# Patient Record
Sex: FEMALE | Race: AMERICAN INDIAN OR ALASKA NATIVE
[De-identification: names, ages, dates, MRNs, and addresses within clinical notes are randomized per-mention and may not be internally consistent; named-entity substitution may affect disease eponyms.]

---

## 2018-07-12 ENCOUNTER — HOSPITAL ENCOUNTER (OUTPATIENT)
Dept: HOSPITAL 5 - MAMMO | Age: 58
Discharge: HOME | End: 2018-07-12
Attending: FAMILY MEDICINE
Payer: OTHER GOVERNMENT

## 2018-07-12 DIAGNOSIS — Z12.31: Primary | ICD-10-CM

## 2018-07-12 PROCEDURE — 77067 SCR MAMMO BI INCL CAD: CPT

## 2018-07-12 NOTE — MAMMOGRAPHY REPORT
BILATERAL DIGITAL SCREENING MAMMOGRAM with CAD: July 12 eighteen



CLINICAL: Routine screening.



COMPARISON:None available.  However, a prior mammogram was apparently 

done at [facility].



FINDINGS: There are bilateral scattered fibroglandular densities.Subtle 

bilateral asymmetries on the CC views require comparison with the prior 

mammogram.



IMPRESSION: Bilateral asymmetries  requiring further evaluation.



BI-RADS CATEGORY:  0 -- Additional Evaluation Required



RECOMMENDATION: Comparison with a previous mammogram.



We will attempt to obtain a prior mammogram for comparison.  If we do 

not obtain a prior mammogram  within 30 days, a revised report will be 

issued recommending a recall for additional imaging. Please be advised 

that the patient should not schedule an appointment for return until 

adequate time (at least 2 weeks) has passed for us to obtain the prior 

mammogram.



ACR BI-RADS MAMMOGRAPHIC CODES:

0 = Needs additional imaging evaluation; 1 = Negative; 2 = Benign; 3 = 

Probably benign; 4 = Suspicious; 5 = Malignant; 6 = Known biopsy-proven 

malignancy



COMMENT:

      1.   Dense breast tissue, i.e., adenosis, fibrocystic 

            changes, etc., may obscure an underlying neoplasm.

      2.   Approximately 10% of cancers are not detected with

            mammography.

      3.   A negative mammography report should not delay biopsy 

            if a clinically suspicious mass is present.



COMMENT:

Patient follow-up letters are generated via our [employer] application.

## 2018-10-08 ENCOUNTER — HOSPITAL ENCOUNTER (OUTPATIENT)
Dept: HOSPITAL 5 - MAMMO | Age: 58
Discharge: HOME | End: 2018-10-08
Attending: FAMILY MEDICINE
Payer: OTHER GOVERNMENT

## 2018-10-08 DIAGNOSIS — R92.8: Primary | ICD-10-CM

## 2018-10-08 NOTE — MAMMOGRAPHY REPORT
RIGHT DIGITAL DIAGNOSTIC MAMMOGRAM : 10/08/18 10:02:00



CLINICAL: Recalled for asymmetry.



COMPARISON:09/12/18 screening



FINDINGS: Additional mammographic views were performed and are negative.



IMPRESSION: Negative Mammogram.



BI-RADS CATEGORY:  1 -- Negative



RECOMMENDATION: Routine mammographic screening in one year.



ACR BI-RADS MAMMOGRAPHIC CODES:

0 = Needs additional imaging evaluation; 1 = Negative; 2 = Benign; 3 = 

Probably benign; 4 = Suspicious; 5 = Malignant; 6 = Known biopsy-proven 

malignancy



COMMENT:

      1.   Dense breast tissue, i.e., adenosis, fibrocystic 

            changes, etc., may obscure an underlying neoplasm.

      2.   Approximately 10% of cancers are not detected with

            mammography.

      3.   A negative mammography report should not delay biopsy 

            if a clinically suspicious mass is present.





COMMENT:

Patient follow-up letters are generated via our [employer] 

application.

## 2022-03-04 ENCOUNTER — HOSPITAL ENCOUNTER (OUTPATIENT)
Dept: HOSPITAL 5 - CT | Age: 62
Discharge: HOME | End: 2022-03-04
Attending: FAMILY MEDICINE
Payer: OTHER GOVERNMENT

## 2022-03-04 DIAGNOSIS — R91.1: Primary | ICD-10-CM

## 2022-03-04 LAB — BUN SERPL-MCNC: 11 MG/DL (ref 7–17)

## 2022-03-04 PROCEDURE — 84520 ASSAY OF UREA NITROGEN: CPT

## 2022-03-04 PROCEDURE — 71260 CT THORAX DX C+: CPT

## 2022-03-04 PROCEDURE — 36415 COLL VENOUS BLD VENIPUNCTURE: CPT

## 2022-03-04 PROCEDURE — 82565 ASSAY OF CREATININE: CPT

## 2022-03-04 NOTE — CAT SCAN REPORT
CT CHEST WITH CONTRAST



INDICATION / CLINICAL INFORMATION: LUNG NODULES OMNI 300 100 ML.



TECHNIQUE: Axial CT images were obtained through the chest after 100 cc of Omnipaque 300 IV contrast.
 All CT scans at this location are performed using CT dose reduction for ALARA by means of automated 
exposure control. 



COMPARISON: None available.



FINDINGS:



HEART: No significant abnormality.

CORONARY ARTERY CALCIFICATION: None.

THORACIC AORTA: No significant abnormality. 

MEDIASTINUM / LENNY: No significant abnormality.

PLEURA: No pleural effusion. No pneumothorax.

LUNGS: No acute air space or interstitial disease. There is a 5 mm solid pulmonary nodule within the 
right upper lobe (series 2 image 26). There is a 4 mm solid pulmonary nodule within the left lower lo
be (series 2 image 65).



ADDITIONAL FINDINGS: Right thyroid lobe is enlarged with multiple hypoattenuating foci.



UPPER ABDOMEN: No significant abnormality.



SKELETAL SYSTEM: No significant abnormality.



IMPRESSION:

1. Multiple incidental pulmonary nodule(s) in the right upper and left lower lobe measuring up to 5 m
m with solid characteristics. Recommendation according to Fleischner Society 2017 Guidelines: Low Ris
k Patient: No routine follow-up; High Risk Patient: Optional CT at 12 months.





Signer Name: Romaine Cardona DO 

Signed: 3/4/2022 9:54 AM

Workstation Name: [id number]